# Patient Record
Sex: MALE | Race: WHITE | NOT HISPANIC OR LATINO | Employment: FULL TIME | URBAN - METROPOLITAN AREA
[De-identification: names, ages, dates, MRNs, and addresses within clinical notes are randomized per-mention and may not be internally consistent; named-entity substitution may affect disease eponyms.]

---
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
## 2020-06-03 ENCOUNTER — COMMUNICATION - HEALTHEAST (OUTPATIENT)
Dept: FAMILY MEDICINE | Facility: CLINIC | Age: 20
End: 2020-06-03

## 2020-06-04 ENCOUNTER — COMMUNICATION - HEALTHEAST (OUTPATIENT)
Dept: FAMILY MEDICINE | Facility: CLINIC | Age: 20
End: 2020-06-04

## 2020-08-31 ENCOUNTER — OFFICE VISIT - HEALTHEAST (OUTPATIENT)
Dept: INTERNAL MEDICINE | Facility: CLINIC | Age: 20
End: 2020-08-31

## 2020-08-31 ENCOUNTER — COMMUNICATION - HEALTHEAST (OUTPATIENT)
Dept: TELEHEALTH | Facility: CLINIC | Age: 20
End: 2020-08-31

## 2020-08-31 DIAGNOSIS — L70.0 ACNE VULGARIS: ICD-10-CM

## 2020-08-31 DIAGNOSIS — L70.9 ACNE: ICD-10-CM

## 2020-08-31 DIAGNOSIS — Z00.00 ROUTINE GENERAL MEDICAL EXAMINATION AT A HEALTH CARE FACILITY: ICD-10-CM

## 2020-08-31 RX ORDER — CLINDAMYCIN PHOSPHATE 11.9 MG/ML
SOLUTION TOPICAL EVERY MORNING
Qty: 30 ML | Refills: 4 | Status: SHIPPED | OUTPATIENT
Start: 2020-08-31

## 2020-08-31 RX ORDER — ADAPALENE 45 G/G
GEL TOPICAL AT BEDTIME
Qty: 45 G | Refills: 4 | Status: SHIPPED | OUTPATIENT
Start: 2020-08-31

## 2020-08-31 ASSESSMENT — MIFFLIN-ST. JEOR: SCORE: 2066.33

## 2021-01-12 ENCOUNTER — RECORDS - HEALTHEAST (OUTPATIENT)
Dept: ADMINISTRATIVE | Facility: OTHER | Age: 21
End: 2021-01-12

## 2021-01-18 ENCOUNTER — OFFICE VISIT - HEALTHEAST (OUTPATIENT)
Dept: INTERNAL MEDICINE | Facility: CLINIC | Age: 21
End: 2021-01-18

## 2021-01-18 DIAGNOSIS — G47.00 INSOMNIA, UNSPECIFIED TYPE: ICD-10-CM

## 2021-04-01 ENCOUNTER — RECORDS - HEALTHEAST (OUTPATIENT)
Dept: ADMINISTRATIVE | Facility: OTHER | Age: 21
End: 2021-04-01

## 2021-05-25 ENCOUNTER — RECORDS - HEALTHEAST (OUTPATIENT)
Dept: ADMINISTRATIVE | Facility: CLINIC | Age: 21
End: 2021-05-25

## 2021-05-31 VITALS — WEIGHT: 187.1 LBS | BODY MASS INDEX: 22.09 KG/M2 | HEIGHT: 77 IN

## 2021-06-01 VITALS — HEIGHT: 77 IN | WEIGHT: 199.4 LBS | BODY MASS INDEX: 23.54 KG/M2

## 2021-06-01 VITALS — BODY MASS INDEX: 23.56 KG/M2 | WEIGHT: 199.5 LBS | HEIGHT: 77 IN

## 2021-06-01 VITALS — BODY MASS INDEX: 23.7 KG/M2 | WEIGHT: 198.6 LBS

## 2021-06-01 VITALS — WEIGHT: 199 LBS | HEIGHT: 77 IN | BODY MASS INDEX: 23.5 KG/M2

## 2021-06-04 VITALS
BODY MASS INDEX: 24.44 KG/M2 | HEIGHT: 77 IN | HEART RATE: 87 BPM | WEIGHT: 207 LBS | DIASTOLIC BLOOD PRESSURE: 78 MMHG | SYSTOLIC BLOOD PRESSURE: 134 MMHG

## 2021-06-05 VITALS
DIASTOLIC BLOOD PRESSURE: 70 MMHG | WEIGHT: 211 LBS | BODY MASS INDEX: 25.02 KG/M2 | HEART RATE: 76 BPM | SYSTOLIC BLOOD PRESSURE: 105 MMHG

## 2021-06-08 NOTE — TELEPHONE ENCOUNTER
Who is calling:  Nichelle Echavarria mom is calling to reschedule the est care appointment the clinic called about because it needs to be in July for an in person visit.  It is scheduled as an establish care physical with Dr. Omer Martinez and it was okay at the time of the scheduling as it is exactly that in our system. However, it won't allow me to reschedule an establish care physical with Dr. Omer Martinez now. Probably because he is not accepting est. Care. However, needs to be rescheduled as an est. Care physical with Dr. Martinez as patient has been waiting to see him Can someone override this and help schedule for 8/31st afternoon  or 9/1 anytime. Please. Please call mom. Thank you!  Reason for Call:  See above   Date of last appointment with primary care: N/A  Okay to leave a detailed message: Yes

## 2021-06-08 NOTE — TELEPHONE ENCOUNTER
Please ask Pt to reschedule with PCP since its been over 2 years since he has been seen or see if he is wanting to EST care with a different provider. Appointment is not appropriate with the scheduled provider.

## 2021-06-11 NOTE — PROGRESS NOTES
Assessment:      Healthy male exam.      Acne, well controlled     Plan:      His topical acne medications were refilled.  We discussed responsible alcohol use should he choose to drink and protection with sexual intercourse.  Follow-up with me every couple of years for routine physical, earlier if needed.    Subjective:      Alvin Echavarria is a 20 y.o. male who presents for an annual exam.  He is feeling well today and has no acute complaints.  Past medical and surgical history reviewed.  Medications and allergies were reviewed and reconciled.  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    Immunization History   Administered Date(s) Administered     DTaP, historic 2000, 2000, 01/24/2001, 10/26/2001, 07/29/2005     Hep B, historic 2000, 2000, 10/26/2001     HiB, historic,unspecified 2000, 2000, 10/26/2001     IPV 2000, 2000, 01/24/2001, 07/29/2005     MMR 08/01/2001, 07/29/2005     Meningococcal MCV4P 10/19/2017     Pneumo Conj 7-V(before 2010) 2000, 2000, 01/24/2001, 08/01/2001     Tdap 08/10/2011     Varicella 08/01/2001, 08/10/2011     Immunization status: up to date and documented.    No exam data present    Current Outpatient Medications   Medication Sig Dispense Refill     adapalene (DIFFERIN) 0.1 % gel Apply topically at bedtime. Please call 24/7 for lab appointment to check lipids;order is in chart since July 45 g 4     clindamycin (CLEOCIN T) 1 % external solution Apply topically every morning. 30 mL 4     No current facility-administered medications for this visit.      History reviewed. No pertinent past medical history.  Past Surgical History:   Procedure Laterality Date     TYMPANOSTOMY TUBE PLACEMENT       Gentamicin  History reviewed. No pertinent family history.  Social History     Socioeconomic History     Marital status: Single     Spouse name: Not on file     Number of children: Not on file      "Years of education: Not on file     Highest education level: Not on file   Occupational History     Not on file   Social Needs     Financial resource strain: Not on file     Food insecurity     Worry: Not on file     Inability: Not on file     Transportation needs     Medical: Not on file     Non-medical: Not on file   Tobacco Use     Smoking status: Never Smoker     Smokeless tobacco: Never Used   Substance and Sexual Activity     Alcohol use: Not on file     Drug use: Not on file     Sexual activity: Not on file   Lifestyle     Physical activity     Days per week: Not on file     Minutes per session: Not on file     Stress: Not on file   Relationships     Social connections     Talks on phone: Not on file     Gets together: Not on file     Attends Advent service: Not on file     Active member of club or organization: Not on file     Attends meetings of clubs or organizations: Not on file     Relationship status: Not on file     Intimate partner violence     Fear of current or ex partner: Not on file     Emotionally abused: Not on file     Physically abused: Not on file     Forced sexual activity: Not on file   Other Topics Concern     Not on file   Social History Narrative     Not on file       Review of Systems  General:  Denies problem  Eyes: Denies problem  Ears/Nose/Throat: Denies problem  Cardiovascular: Denies problem  Respiratory:  Denies problem  Gastrointestinal:  Denies problem  Genitourinary: Denies problem  Musculoskeletal:  Denies problem  Skin: Denies problem  Neurologic: Denies problem  Psychiatric: Denies problem  Endocrine: Denies problem  Heme/Lymphatic: Denies problem   Allergic/Immunologic: Denies problem        Objective:     Vitals:    08/31/20 1445   BP: 134/78   Pulse: 87   Weight: 207 lb (93.9 kg)   Height: 6' 5\" (1.956 m)     Body mass index is 24.55 kg/m .    Physical  General Appearance: Alert, cooperative, no distress, appears stated age  Head: Normocephalic, without obvious " abnormality, atraumatic  Eyes: PERRL, conjunctiva/corneas clear, EOM's intact  Ears: Normal TM's and external ear canals, both ears  Nose: Nares normal, septum midline,mucosa normal, no drainage  Throat: Lips, mucosa, and tongue normal; teeth and gums normal  Neck: Supple, symmetrical, trachea midline, no adenopathy;  thyroid: not enlarged, symmetric, no tenderness/mass/nodules; no carotid bruit or JVD  Back: Symmetric, no curvature, ROM normal, no CVA tenderness  Lungs: Clear to auscultation bilaterally, respirations unlabored  Heart: Regular rate and rhythm, S1 and S2 normal, no murmur, rub, or gallop,  Abdomen: Soft, non-tender, bowel sounds active all four quadrants,  no masses, no organomegaly  Musculoskeletal: Normal range of motion. No joint swelling or deformity.   Extremities: Extremities normal, atraumatic, no cyanosis or edema  Skin: Skin color, texture, turgor normal, no rashes or lesions  Lymph nodes: Cervical, supraclavicular, and axillary nodes normal  Neurologic: He is alert. He has normal reflexes.   Psychiatric: He has a normal mood and affect.

## 2021-06-12 NOTE — PROGRESS NOTES
[facility] Well Child Check    ASSESSMENT & PLAN  Alvin Echavarria is a 17  y.o. 0  m.o. who has normal growth and normal development.    Diagnoses and all orders for this visit:    WCC (well child check) - Alvin has to work later, so family prefers to hold off on vaccines today; mom is not sure about Hepatitis A and HPV; counseling provided and VIS distributed; will order all vaccines for which he is due and hopefully when return will get all done; also will come back fasting for cholesterol    Meningococcal MCV4P; Future    HPV vaccine 9 valent 3 dose IM; Future    Hepatitis A vaccine pediatric/adolescent 2 dose IM; Future    Lipid profile; Future    Acne vulgaris - Has inflammatory and nodular lesions, more so on chest and upper arms; discussed option of oral antibiotic therapy instead of or in addition to topical, but mom worried about GI upset, so prefers to stay with topical; will call if adapalene 0.1% isn't covered and want to try 0.3%    Adapalene 0.1% gel; apply topically at bedtime; Dispense:  45 g; Refills:  4    Clindamycin 1% external solution; apply topically every morning; Dispense:  30 mL; Refills 4    Allergic conjunctivitis of both eyes    Olopatadine 0.2% ophthalmic drops; 1 drop in each eye 1-2 times daily as needed for allergies; Dispense 2.5 mL; Refills 2    Return to clinic in 1 year for a Well Child Check or sooner as needed    IMMUNIZATIONS/LABS  He will return for immunizations    REFERRALS  Dental:  The patient has already established care with a dentist.  Other:  No additional referrals were made at this time.    ANTICIPATORY GUIDANCE  I have reviewed age appropriate anticipatory guidance.    HEALTH HISTORY  Do you have any concerns that you'd like to discuss today?: No concerns Would like refills of topical acne medications, which he thinks are working well. Not too drying. Would also like refill of Pataday eye drops. Had flare of conjunctivitis last week, but settled down now. Has issues  in spring commonly.    Roomed by: Angy TAYLOR CMA    Accompanied by Mother    Refills needed? Yes    Do you have any forms that need to be filled out? No        Do you have any significant health concerns in your family history?: No  No family history on file.  Since your last visit, have there been any major changes in your family, such as a move, job change, separation, divorce, or death in the family?: No    Home  Who lives in your home?:  Mom, Dad & Brother  Social History     Social History Narrative     Do you have any trouble with sleep?:  No    Education  What school does your child attend?:  [employer]  What grade is your child in?:  12th  How does the patient perform in school (grades, behavior, attention, homework?: Good     Eating  Does patient eat regular meals including fruits and vegetables?:  yes  What is the patient drinking (cow's milk, water, soda, juice, sports drinks, energy drinks, etc)?: cow's milk- 2%, water and soda  Does patient have concerns about body or appearance?:  No    Activities  Does the patient have friends?:  yes  Does the patient get at least one hour of physical activity per day?:  no  Does the patient have less than 2 hours of screen time per day (aside from homework)?:  yes  What does your child do for exercise?:  Baseball & Basketball  Does the patient have interest/participate in community activities/volunteers/school sports?:  yes    MENTAL HEALTH SCREENING  PHQ-2 Score:  0    VISION/HEARING  Vision: Completed. See Results  Hearing:  Completed. See Results     Hearing Screening    Method: Audiometry    125Hz 250Hz 500Hz 1000Hz 2000Hz 3000Hz 4000Hz 6000Hz 8000Hz   Right ear:   25 20 20  20     Left ear:   25 20 20  20        Visual Acuity Screening    Right eye Left eye Both eyes   Without correction: 20/20 20/20    With correction:          TB Risk Assessment:  The patient and/or parent/guardian answer positive to:  none    Dental  Is your child being seen by a dentist?   "Yes  Flouride Varnish Application Screening    Patient Active Problem List   Diagnosis     Allergic conjunctivitis of both eyes     Acne     Dental erosion limited to enamel       Drugs  Does the patient use tobacco/alcohol/drugs?:  no    Safety  Does the patient have any safety concerns (peer or home)?:  no  Does the patient use safety belts, helmets and other safety equipment?:  yes    Sex  Is the patient sexually active?:  no    MEASUREMENTS  Height:  6' 4.5\" (1.943 m)  Weight: 187 lb 1.6 oz (84.9 kg)  BMI: Body mass index is 22.48 kg/(m^2).  Blood Pressure: 114/64  Blood pressure percentiles are 21 % systolic and 30 % diastolic based on NHBPEP's 4th Report. Blood pressure percentile targets: 90: 136/85, 95: 140/89, 99 + 5 mmH/102.    PHYSICAL EXAM  GEN: alert, well appearing  EYES: clear  R EAR: canal clear, TM pearly gray  L EAR: canal clear, TM pearly gray  NOSE: clear  OROPHARYNX: clear  NECK: supple, no significant LAD, no thyromegaly  CVS: RRR, normal S1/S2, no murmur  LUNGS: clear, no increased work of breathing  ABD: soft, non-tender, non-distended  : SMR 4-5; no hernias appreciated; testicles normal  EXT: warm, well perfused, no swelling  MSK: nl muscle bulk, spine straight  NEURO: CN grossly intact, nl strength in UE and LE, nl gait, no dysmetria  SKIN: inflammatory acne on chest and upper arms with occasional nodular lesions on face    "

## 2021-06-14 NOTE — PROGRESS NOTES
Assessment & Plan   Problem List Items Addressed This Visit     None      Visit Diagnoses     Insomnia, unspecified type    -  Primary         Insomnia, mild.  I do not hear any symptoms today that would be concerning for generalized anxiety disorder or panic disorder, which I am thankful for.  I told him that he could use melatonin 5 to 10 mg per night to try to train his brain and his body to sleep better while at school.  If he is not improved in 2 to 3 weeks he is going to contact me and we could consider other management at that time.  Otherwise he will follow-up as needed.          No follow-ups on file.    Omer Martinez MD  [facility]      Subjective     Alivn Echavarria is 20 y.o. and presents to clinic today for the following health issues     Rasheed comes in today for follow-up of ER visit for shortness of breath and a racing heartbeat.  He was seen on 1/12 after going in with the aformentioned symptoms.  BNP and troponin were negative.  D-dimer was negative.  EKG showed sinus tachycardia but was otherwise unremarkable.  Chest x-ray was negative.  He was then told to follow-up with me today.  Currently Alvin states that he has not had any recurrence of his symptoms.  He does think that they are related to some insomnia that he has had.  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  He states that when he is sleeping at college he has a hard time getting to sleep and that can affect his mood and alertness the next day.  He does not have any symptoms during the day per se.  He has not tried anything over-the-counter for sleep.        Objective    /70   Pulse 76   Wt 211 lb (95.7 kg)   BMI 25.02 kg/m    Body mass index is 25.02 kg/m .  Physical Exam

## 2021-06-16 PROBLEM — B07.0 PLANTAR WARTS: Status: ACTIVE | Noted: 2018-07-02

## 2021-06-17 NOTE — PROGRESS NOTES
"ASSESSMENT:  1. Plantar wart of right foot  Discussed warts, home treatment, cryotherapy, cantharidin, and other treatment options, and indications for dermatology consultation.  Treatment declined today.  Return as needed.      PLAN:  Patient Instructions   Daily Wart Instructions:  1.  Soak 10 minutes in warm water to soften the area.   2.  \"Sand\" with pumice stone or emery board.  3.  Liquid salicylic acid 17% (in the corn section at [employer]).      No orders of the defined types were placed in this encounter.    Medications Discontinued During This Encounter   Medication Reason     adapalene (DIFFERIN) 0.1 % gel Duplicate order     amoxicillin (AMOXIL) 400 mg/5 mL suspension Therapy completed       No Follow-up on file.    CHIEF COMPLAINT:  Chief Complaint   Patient presents with     Toe Pain     Right large toe possible wart, hurts just a little when pressed on       HISTORY OF PRESENT ILLNESS:  Alvin is a 17 y.o. male presenting to the clinic today with mom with concerns for right great toe pain, possible wart. Symptoms started last summer and bothers mom more than him. His wart is slightly painful when pressed but he denies pain with walking. Mom has been trying home treatment for the warts.    REVIEW OF SYSTEMS:   He has history of a wart on his face when he was much younger, likely treated by dermatology. All other systems are negative.    PFSH:  History of warts as reviewed above. He has prom this weekend and mom does not want him to be in pain for the day.     TOBACCO USE:  History   Smoking Status     Never Smoker   Smokeless Tobacco     Never Used       VITALS:  Vitals:    04/24/18 0928   BP: 102/56   Patient Site: Left Arm   Patient Position: Sitting   Cuff Size: Adult Large   Pulse: 68   Weight: 199 lb (90.3 kg)   Height: 6' 4.75\" (1.949 m)     Wt Readings from Last 3 Encounters:   04/24/18 199 lb (90.3 kg) (94 %, Z= 1.59)*   07/28/17 187 lb 1.6 oz (84.9 kg) (93 %, Z= 1.44)*   12/23/16 186 lb 11.2 oz " (84.7 kg) (94 %, Z= 1.57)*     * Growth percentiles are based on [facility] 2-20 Years data.     Body mass index is 23.75 kg/(m^2).    PHYSICAL EXAM:  Alert, no acute distress.   Skin, Approximately 1 cm diameter wart on the medial aspect of the pad of the right great toe.  Neuro, moving all extremities equally.    ADDITIONAL HISTORY SUMMARIZED (2): None.  DECISION TO OBTAIN EXTRA INFORMATION (1): None.   RADIOLOGY TESTS (1): None.  LABS (1): None.  MEDICINE TESTS (1): None.  INDEPENDENT REVIEW (2 each): None.     The visit lasted a total of 15 minutes face to face with the patient. Over 50% of the time was spent counseling and educating the patient about toe pain.    I, Sindi Bunn, am scribing for and in the presence of, Dr. Gu.    I, Daniel Gu, personally performed the services described in this documentation, as scribed by Sindi Bunn in my presence, and it is both accurate and complete.    MEDICATIONS:  Current Outpatient Prescriptions   Medication Sig Dispense Refill     adapalene (DIFFERIN) 0.1 % gel Apply topically at bedtime. Please call 24/7 for lab appointment to check lipids;order is in chart since July 45 g 4     clindamycin (CLEOCIN T) 1 % external solution Apply topically every morning. 30 mL 4     olopatadine (PATADAY) 0.2 % Drop Administer 1 drop to both eyes 2 (two) times a day as needed (for allergies). 2.5 mL 2     No current facility-administered medications for this visit.        Total data points: 0

## 2021-06-19 NOTE — PROGRESS NOTES
"ASSESSMENT:  1. Plantar warts      PLAN:  Cryotherapy was done ×3 with liquid nitrogen, with greater than 10 seconds thaw each time; there were no complications.  Home wart treatment using 17% salicylic acid was reviewed.  Return to clinic in 2 weeks for cryotherapy, if desired.    Patient Instructions   Daily Wart Instructions:  1.  Soak 10 minutes in warm water to soften the area.   2.  \"Sand\" with pumice stone or emery board.  3.  Liquid salicylic acid 17%.      No orders of the defined types were placed in this encounter.    There are no discontinued medications.    No Follow-up on file.    CHIEF COMPLAINT:  Chief Complaint   Patient presents with     Follow-up     Wart treatment- bilateral feet.        HISTORY OF PRESENT ILLNESS:  Alvin is a 18 y.o. male presenting to the clinic today with mom with concerns for plantar warts. He had this wart treated on 7/2 and has been doing at home treatments. The wart is present under the first digit of his right great toe. The wart is not painful. No new warts. He has missed a few days of treatment. He has been using a pumice stone to remove the callus.     REVIEW OF SYSTEMS:   All other systems are negative.    PFSH:  Reviewed as below.     TOBACCO USE:  History   Smoking Status     Never Smoker   Smokeless Tobacco     Never Used       VITALS:  Vitals:    07/20/18 0844   BP: 124/70   Patient Site: Left Arm   Patient Position: Sitting   Cuff Size: Adult Regular   Resp: 14   Weight: 198 lb 9.6 oz (90.1 kg)     Wt Readings from Last 3 Encounters:   07/20/18 198 lb 9.6 oz (90.1 kg) (94 %, Z= 1.55)*   07/02/18 199 lb 6.4 oz (90.4 kg) (94 %, Z= 1.58)*   04/24/18 199 lb (90.3 kg) (94 %, Z= 1.59)*     * Growth percentiles are based on CDC 2-20 Years data.     There is no height or weight on file to calculate BMI.    PHYSICAL EXAM:  Alert, well appearing male.   Skin, Large plantar wart on the medial volar aspect of the right great toe.   Neuro, moving all extremities " equally.    MEDICATIONS:  Current Outpatient Prescriptions   Medication Sig Dispense Refill     adapalene (DIFFERIN) 0.1 % gel Apply topically at bedtime. Please call 24/7 for lab appointment to check lipids;order is in chart since July 45 g 4     clindamycin (CLEOCIN T) 1 % external solution Apply topically every morning. 30 mL 4     olopatadine (PATADAY) 0.2 % Drop Administer 1 drop to both eyes 2 (two) times a day as needed (for allergies). 2.5 mL 2     No current facility-administered medications for this visit.          ADDITIONAL HISTORY SUMMARIZED (2): None.  DECISION TO OBTAIN EXTRA INFORMATION (1): None.   RADIOLOGY TESTS (1): None.  LABS (1): None.  MEDICINE TESTS (1): None.  INDEPENDENT REVIEW (2 each): None.     The visit lasted a total of 11 minutes face to face with the patient. Over 50% of the time was spent counseling and educating the patient about plantar wart.    I, Augustina Mancera, am scribing for and in the presence of, Dr. Gu.    I, Dr. Daniel Gu, personally performed the services described in this documentation, as scribed by Augustina Mancera in my presence, and it is both accurate and complete.    Total data points: 0

## 2021-06-19 NOTE — PROGRESS NOTES
"ASSESSMENT:  1. Plantar warts    PLAN:  Cryotherapy was done ×3 with liquid nitrogen, with greater than 10 seconds thaw each time; there were no complications. Home wart treatment using 17% salicylic acid was reviewed.  Return to clinic in 2 weeks for cryotherapy, if desired.    Patient Instructions   Tea tree oil for possible fungus of right great toenail.    Daily Wart Instructions:  1.  Soak 10 minutes in warm water to soften the area.   2.  \"Sand\" with pumice stone or emery board.  3.  Liquid salicylic acid 17%.      No orders of the defined types were placed in this encounter.    Medications Discontinued During This Encounter   Medication Reason     adapalene (DIFFERIN) 0.1 % gel      olopatadine 0.2 % Drop        No Follow-up on file.    CHIEF COMPLAINT:  Chief Complaint   Patient presents with     Warts     right toe        HISTORY OF PRESENT ILLNESS:  Alvin is a 17 y.o. male presenting to the clinic today with mom with concerns for wart on right great toe. Symptoms started early April and he was seen in clinic but did not want to treat to due upcoming prom. He did soak his toe and use a pumice stone without medication.    REVIEW OF SYSTEMS:   All other systems are negative.    PFSH:  He plans to travel to [place] with family this summer. He is also working two separate jobs this summer.    TOBACCO USE:  History   Smoking Status     Never Smoker   Smokeless Tobacco     Never Used       VITALS:  Vitals:    07/02/18 0923   BP: 94/62   Patient Site: Left Arm   Patient Position: Sitting   Cuff Size: Adult Large   Weight: 199 lb 6.4 oz (90.4 kg)   Height: 6' 4.75\" (1.949 m)     Wt Readings from Last 3 Encounters:   07/02/18 199 lb 6.4 oz (90.4 kg) (94 %, Z= 1.58)*   04/24/18 199 lb (90.3 kg) (94 %, Z= 1.59)*   07/28/17 187 lb 1.6 oz (84.9 kg) (93 %, Z= 1.44)*     * Growth percentiles are based on CDC 2-20 Years data.     Body mass index is 23.8 kg/(m^2).    PHYSICAL EXAM:  Alert, no acute distress.   Skin, " Approximately 1 cm diameter plantar wart on the medial volar aspect of the right great toe.    ADDITIONAL HISTORY SUMMARIZED (2): None.  DECISION TO OBTAIN EXTRA INFORMATION (1): None.   RADIOLOGY TESTS (1): None.  LABS (1): None.  MEDICINE TESTS (1): None.  INDEPENDENT REVIEW (2 each): None.     The visit lasted a total of 15 minutes face to face with the patient. Over 50% of the time was spent counseling and educating the patient about wart.    ISindi, am scribing for and in the presence of, Dr. Gu.    IDaniel, personally performed the services described in this documentation, as scribed by Sindi Bunn in my presence, and it is both accurate and complete.    MEDICATIONS:  Current Outpatient Prescriptions   Medication Sig Dispense Refill     adapalene (DIFFERIN) 0.1 % gel Apply topically at bedtime. Please call 24/7 for lab appointment to check lipids;order is in chart since July 45 g 4     clindamycin (CLEOCIN T) 1 % external solution Apply topically every morning. 30 mL 4     olopatadine (PATADAY) 0.2 % Drop Administer 1 drop to both eyes 2 (two) times a day as needed (for allergies). 2.5 mL 2     No current facility-administered medications for this visit.        Total data points: 0

## 2021-06-19 NOTE — PROGRESS NOTES
"Assessment     18 y.o. male  1. Plantar wart       Plan:     Cryotherapy was done ×3 with liquid nitrogen, with greater than 10 seconds thaw each time; there were no complications.  Home wart treatment using 17% salicylic acid was reviewed.  Return to clinic in 2 weeks for cryotherapy, if desired.      Subjective:      HPI: Alvin Echavarria is a 18 y.o. male here with mother for wart treatment.  There was a blister after his last cryotherapy that resolved quickly.  He has been using a pumice stone after soaking, and applying liquid salicylic acid almost nightly since his last visit.  He feels the wart is a little smaller.  No new lesions.  Overall health has been good.    Past Medical History:   Diagnosis Date     Constipation      History of airway aspiration      Pneumonia     LLL     Pyogenic granuloma      Speech/language delay      Warts     facial, flat warts     Past Surgical History:   Procedure Laterality Date     TYMPANOSTOMY TUBE PLACEMENT       Gentamicin  Outpatient Medications Prior to Visit   Medication Sig Dispense Refill     adapalene (DIFFERIN) 0.1 % gel Apply topically at bedtime. Please call 24/7 for lab appointment to check lipids;order is in chart since July 45 g 4     clindamycin (CLEOCIN T) 1 % external solution Apply topically every morning. 30 mL 4     olopatadine (PATADAY) 0.2 % Drop Administer 1 drop to both eyes 2 (two) times a day as needed (for allergies). 2.5 mL 2     No facility-administered medications prior to visit.      No family history on file.  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   Skin, on the medial aspect " of the pad of the right great toe there is a several centimeter diameter callus, with a central plantar wart, approximately 0.5 x 1 cm in diameter.

## 2021-06-25 NOTE — TELEPHONE ENCOUNTER
Refill Approved    Rx renewed per Medication Renewal Policy. Medication was last renewed on 7/28/17.    Ayse Pena, [facility] Triage/Med Refill 3/21/2019     Requested Prescriptions   Pending Prescriptions Disp Refills     olopatadine 0.2 % Drop [Pharmacy Med Name: OLOPATADINE HCL 0.2% EYE DROP] 2.5 mL 1     Sig: INSTILL 1 DROP IN EACH EYE 1-2 TIMES DAILY AS NEEDED FOR ALLERGIES.    Opthalmic Allergy Drops Refill Protocol Passed - 3/18/2019  8:25 PM       Passed - Patient has had office visit/physical in last 12 months    Last office visit with prescriber/PCP: 8/10/2018 Daniel Gu MD OR same dept: 8/10/2018 Daniel Gu MD OR same specialty: 8/10/2018 Daniel Gu MD  Last physical: 7/15/2016 Last MTM visit: Visit date not found   Next visit within 3 mo: Visit date not found  Next physical within 3 mo: Visit date not found  Prescriber OR PCP: Daniel Gu MD  Last diagnosis associated with med order: 1. Allergic conjunctivitis of both eyes  - olopatadine 0.2 % Drop [Pharmacy Med Name: OLOPATADINE HCL 0.2% EYE DROP]; INSTILL 1 DROP IN EACH EYE 1-2 TIMES DAILY AS NEEDED FOR ALLERGIES.  Dispense: 2.5 mL; Refill: 1    If protocol passes may refill for 12 months if within 3 months of last provider visit (or a total of 15 months).

## 2021-09-08 ENCOUNTER — TELEPHONE (OUTPATIENT)
Dept: INTERNAL MEDICINE | Facility: CLINIC | Age: 21
End: 2021-09-08

## 2021-09-08 DIAGNOSIS — L70.0 ACNE VULGARIS: Primary | ICD-10-CM

## 2021-09-08 NOTE — TELEPHONE ENCOUNTER
Pt is requesting a new rx for Tretinoin 0.05% cream which is not on his med list. Please advise. Thanks.

## 2021-09-12 RX ORDER — TRETINOIN 0.5 MG/G
CREAM TOPICAL
Qty: 45 G | Refills: 6 | OUTPATIENT
Start: 2021-09-12

## 2021-09-12 NOTE — TELEPHONE ENCOUNTER
Disp Refills Start End CORDELL    tretinoin (RETIN-A) 0.05 % cream (Discontinued)   1/24/2020 8/31/2020 --   Sig: APPLY A PEA SIZE AMOUNT AT BEDTIME EVERY 3RD NIGHT, INCREASE SLOWLY TO EVERY NIGHT AS TOLERATED   Class: Historical Med   tretinoin (RETIN-A) 0.05 % cream [[id number]]  Patient-reported historical medication  Ordering date: 08/31/20 1448 Authorized by: PROVIDER, HISTORICAL   Frequency:  01/24/20 - 08/31/20 Discontinued by: Omer Martinez MD 08/31/20 8191

## 2021-09-27 NOTE — TELEPHONE ENCOUNTER
Mom called back, states that patient discussed this with Dr Martinez at Jan 2021 visit and Dr Martinez agreed that he would take over refilling the prescription from Dermatology.  Please advise if provider unwilling to prescribe.    Requesting for this to be refilled at this time.

## 2021-09-28 RX ORDER — TRETINOIN 0.5 MG/G
CREAM TOPICAL AT BEDTIME
Qty: 45 G | Refills: 3 | Status: SHIPPED | OUTPATIENT
Start: 2021-09-28 | End: 2023-01-04

## 2023-01-03 DIAGNOSIS — L70.0 ACNE VULGARIS: ICD-10-CM

## 2023-01-04 RX ORDER — TRETINOIN 0.5 MG/G
CREAM TOPICAL AT BEDTIME
Qty: 45 G | Refills: 0 | Status: SHIPPED | OUTPATIENT
Start: 2023-01-04 | End: 2023-05-28

## 2023-01-04 NOTE — TELEPHONE ENCOUNTER
"Routing refill request to provider for review/approval because:  A break in medication  Patient needs to be seen because it has been more than 1 year since last office visit.    Last Written Prescription Date:  9/28/21  Last Fill Quantity: 45 g,  # refills: 3   Last office visit provider:  1/18/21     Requested Prescriptions   Pending Prescriptions Disp Refills     tretinoin (RETIN-A) 0.05 % external cream [Pharmacy Med Name: TRETINOIN 0.05% CREAM] 45 g 3     Sig: APPLY TOPICALLY AT BEDTIME       Topical Acne Medications Protocol Failed - 1/4/2023  9:16 AM        Failed - Recent (12 mo) or future (30 days) visit within the authorizing provider's specialty     Patient has had an office visit with the authorizing provider or a provider within the authorizing providers department within the previous 12 mos or has a future within next 30 days. See \"Patient Info\" tab in inbasket, or \"Choose Columns\" in Meds & Orders section of the refill encounter.              Passed - Patient is 12 years of age or older        Passed - Medication is active on med list             Will Gomez RN 01/04/23 9:16 AM  "

## 2023-04-06 ENCOUNTER — APPOINTMENT (OUTPATIENT)
Dept: URBAN - METROPOLITAN AREA CLINIC 256 | Age: 23
Setting detail: DERMATOLOGY
End: 2023-04-07

## 2023-04-06 VITALS — WEIGHT: 220 LBS | HEIGHT: 77 IN

## 2023-04-06 DIAGNOSIS — L70.0 ACNE VULGARIS: ICD-10-CM

## 2023-04-06 PROCEDURE — OTHER COUNSELING: OTHER

## 2023-04-06 PROCEDURE — 99203 OFFICE O/P NEW LOW 30 MIN: CPT

## 2023-04-06 PROCEDURE — OTHER DEFER: OTHER

## 2023-04-06 PROCEDURE — OTHER PRESCRIPTION MEDICATION MANAGEMENT: OTHER

## 2023-04-06 PROCEDURE — OTHER ADDITIONAL NOTES: OTHER

## 2023-04-06 ASSESSMENT — LOCATION SIMPLE DESCRIPTION DERM: LOCATION SIMPLE: LEFT CHEEK

## 2023-04-06 ASSESSMENT — LOCATION DETAILED DESCRIPTION DERM: LOCATION DETAILED: LEFT INFERIOR CENTRAL MALAR CHEEK

## 2023-04-06 ASSESSMENT — LOCATION ZONE DERM: LOCATION ZONE: FACE

## 2023-04-06 NOTE — HPI: PIMPLES (ACNE)
What Type Of Note Output Would You Prefer (Optional)?: Standard Output
How Severe Is Your Acne?: severe
Is This A New Presentation, Or A Follow-Up?: Acne
Additional Comments (Use Complete Sentences): Patient’s main concern today is scarring from past acne. In the past, patient tried only topical treatment - Tretinoin. (He is unable to swallow pills).

## 2023-04-06 NOTE — PROCEDURE: DEFER
Size Of Lesion In Cm (Optional): 0
Detail Level: Detailed
Introduction Text (Please End With A Colon): The following procedure was deferred:
Other Procedure: Fraxel or microneedling

## 2023-04-06 NOTE — PROCEDURE: ADDITIONAL NOTES
Additional Notes: Patient recommended to have consult with spa side regarding micro-needling or Fraxel. Patient educated about both procedures.
Detail Level: Simple
Render Risk Assessment In Note?: no

## 2023-04-06 NOTE — PROCEDURE: COUNSELING
Use Enhanced Medication Counseling?: No
Spironolactone Counseling: Patient advised regarding risks of diarrhea, abdominal pain, hyperkalemia, birth defects (for female patients), liver toxicity and renal toxicity. The patient may need blood work to monitor liver and kidney function and potassium levels while on therapy. The patient verbalized understanding of the proper use and possible adverse effects of spironolactone.  All of the patient's questions and concerns were addressed.
Minocycline Counseling: Patient advised regarding possible photosensitivity and discoloration of the teeth, skin, lips, tongue and gums.  Patient instructed to avoid sunlight, if possible.  When exposed to sunlight, patients should wear protective clothing, sunglasses, and sunscreen.  The patient was instructed to call the office immediately if the following severe adverse effects occur:  hearing changes, easy bruising/bleeding, severe headache, or vision changes.  The patient verbalized understanding of the proper use and possible adverse effects of minocycline.  All of the patient's questions and concerns were addressed.
Winlevi Pregnancy And Lactation Text: This medication is considered safe during pregnancy and breastfeeding.
Topical Sulfur Applications Pregnancy And Lactation Text: This medication is Pregnancy Category C and has an unknown safety profile during pregnancy. It is unknown if this topical medication is excreted in breast milk.
Aklief counseling:  Patient advised to apply a pea-sized amount only at bedtime and wait 30 minutes after washing their face before applying.  If too drying, patient may add a non-comedogenic moisturizer.  The most commonly reported side effects including irritation, redness, scaling, dryness, stinging, burning, itching, and increased risk of sunburn.  The patient verbalized understanding of the proper use and possible adverse effects of retinoids.  All of the patient's questions and concerns were addressed.
Aklief Pregnancy And Lactation Text: It is unknown if this medication is safe to use during pregnancy.  It is unknown if this medication is excreted in breast milk.  Breastfeeding women should use the topical cream on the smallest area of the skin for the shortest time needed while breastfeeding.  Do not apply to nipple and areola.
Azelaic Acid Pregnancy And Lactation Text: This medication is considered safe during pregnancy and breast feeding.
Dapsone Counseling: I discussed with the patient the risks of dapsone including but not limited to hemolytic anemia, agranulocytosis, rashes, methemoglobinemia, kidney failure, peripheral neuropathy, headaches, GI upset, and liver toxicity.  Patients who start dapsone require monitoring including baseline LFTs and weekly CBCs for the first month, then every month thereafter.  The patient verbalized understanding of the proper use and possible adverse effects of dapsone.  All of the patient's questions and concerns were addressed.
Azithromycin Counseling:  I discussed with the patient the risks of azithromycin including but not limited to GI upset, allergic reaction, drug rash, diarrhea, and yeast infections.
Birth Control Pills Pregnancy And Lactation Text: This medication should be avoided if pregnant and for the first 30 days post-partum.
Spironolactone Pregnancy And Lactation Text: This medication can cause feminization of the male fetus and should be avoided during pregnancy. The active metabolite is also found in breast milk.
Detail Level: Zone
Erythromycin Counseling:  I discussed with the patient the risks of erythromycin including but not limited to GI upset, allergic reaction, drug rash, diarrhea, increase in liver enzymes, and yeast infections.
Benzoyl Peroxide Pregnancy And Lactation Text: This medication is Pregnancy Category C. It is unknown if benzoyl peroxide is excreted in breast milk.
Topical Sulfur Applications Counseling: Topical Sulfur Counseling: Patient counseled that this medication may cause skin irritation or allergic reactions.  In the event of skin irritation, the patient was advised to reduce the amount of the drug applied or use it less frequently.   The patient verbalized understanding of the proper use and possible adverse effects of topical sulfur application.  All of the patient's questions and concerns were addressed.
High Dose Vitamin A Pregnancy And Lactation Text: High dose vitamin A therapy is contraindicated during pregnancy and breast feeding.
Tetracycline Pregnancy And Lactation Text: This medication is Pregnancy Category D and not consider safe during pregnancy. It is also excreted in breast milk.
Sarecycline Counseling: Patient advised regarding possible photosensitivity and discoloration of the teeth, skin, lips, tongue and gums.  Patient instructed to avoid sunlight, if possible.  When exposed to sunlight, patients should wear protective clothing, sunglasses, and sunscreen.  The patient was instructed to call the office immediately if the following severe adverse effects occur:  hearing changes, easy bruising/bleeding, severe headache, or vision changes.  The patient verbalized understanding of the proper use and possible adverse effects of sarecycline.  All of the patient's questions and concerns were addressed.
Topical Clindamycin Pregnancy And Lactation Text: This medication is Pregnancy Category B and is considered safe during pregnancy. It is unknown if it is excreted in breast milk.
Benzoyl Peroxide Counseling: Patient counseled that medicine may cause skin irritation and bleach clothing.  In the event of skin irritation, the patient was advised to reduce the amount of the drug applied or use it less frequently.   The patient verbalized understanding of the proper use and possible adverse effects of benzoyl peroxide.  All of the patient's questions and concerns were addressed.
Bactrim Counseling:  I discussed with the patient the risks of sulfa antibiotics including but not limited to GI upset, allergic reaction, drug rash, diarrhea, dizziness, photosensitivity, and yeast infections.  Rarely, more serious reactions can occur including but not limited to aplastic anemia, agranulocytosis, methemoglobinemia, blood dyscrasias, liver or kidney failure, lung infiltrates or desquamative/blistering drug rashes.
Topical Retinoid Pregnancy And Lactation Text: This medication is Pregnancy Category C. It is unknown if this medication is excreted in breast milk.
Topical Clindamycin Counseling: Patient counseled that this medication may cause skin irritation or allergic reactions.  In the event of skin irritation, the patient was advised to reduce the amount of the drug applied or use it less frequently.   The patient verbalized understanding of the proper use and possible adverse effects of clindamycin.  All of the patient's questions and concerns were addressed.
Dapsone Pregnancy And Lactation Text: This medication is Pregnancy Category C and is not considered safe during pregnancy or breast feeding.
Topical Retinoid counseling:  Patient advised to apply a pea-sized amount only at bedtime and wait 30 minutes after washing their face before applying.  If too drying, patient may add a non-comedogenic moisturizer. The patient verbalized understanding of the proper use and possible adverse effects of retinoids.  All of the patient's questions and concerns were addressed.
Doxycycline Pregnancy And Lactation Text: This medication is Pregnancy Category D and not consider safe during pregnancy. It is also excreted in breast milk but is considered safe for shorter treatment courses.
Isotretinoin Pregnancy And Lactation Text: This medication is Pregnancy Category X and is considered extremely dangerous during pregnancy. It is unknown if it is excreted in breast milk.
Erythromycin Pregnancy And Lactation Text: This medication is Pregnancy Category B and is considered safe during pregnancy. It is also excreted in breast milk.
Azithromycin Pregnancy And Lactation Text: This medication is considered safe during pregnancy and is also secreted in breast milk.
Isotretinoin Counseling: Patient should get monthly blood tests, not donate blood, not drive at night if vision affected, not share medication, and not undergo elective surgery for 6 months after tx completed. Side effects reviewed, pt to contact office should one occur.
Doxycycline Counseling:  Patient counseled regarding possible photosensitivity and increased risk for sunburn.  Patient instructed to avoid sunlight, if possible.  When exposed to sunlight, patients should wear protective clothing, sunglasses, and sunscreen.  The patient was instructed to call the office immediately if the following severe adverse effects occur:  hearing changes, easy bruising/bleeding, severe headache, or vision changes.  The patient verbalized understanding of the proper use and possible adverse effects of doxycycline.  All of the patient's questions and concerns were addressed.
Tetracycline Counseling: Patient counseled regarding possible photosensitivity and increased risk for sunburn.  Patient instructed to avoid sunlight, if possible.  When exposed to sunlight, patients should wear protective clothing, sunglasses, and sunscreen.  The patient was instructed to call the office immediately if the following severe adverse effects occur:  hearing changes, easy bruising/bleeding, severe headache, or vision changes.  The patient verbalized understanding of the proper use and possible adverse effects of tetracycline.  All of the patient's questions and concerns were addressed. Patient understands to avoid pregnancy while on therapy due to potential birth defects.
Tazorac Counseling:  Patient advised that medication is irritating and drying.  Patient may need to apply sparingly and wash off after an hour before eventually leaving it on overnight.  The patient verbalized understanding of the proper use and possible adverse effects of tazorac.  All of the patient's questions and concerns were addressed.
Bactrim Pregnancy And Lactation Text: This medication is Pregnancy Category D and is known to cause fetal risk.  It is also excreted in breast milk.
High Dose Vitamin A Counseling: Side effects reviewed, pt to contact office should one occur.
Winlevi Counseling:  I discussed with the patient the risks of topical clascoterone including but not limited to erythema, scaling, itching, and stinging. Patient voiced their understanding.
Azelaic Acid Counseling: Patient counseled that medicine may cause skin irritation and to avoid applying near the eyes.  In the event of skin irritation, the patient was advised to reduce the amount of the drug applied or use it less frequently.   The patient verbalized understanding of the proper use and possible adverse effects of azelaic acid.  All of the patient's questions and concerns were addressed.
Tazorac Pregnancy And Lactation Text: This medication is not safe during pregnancy. It is unknown if this medication is excreted in breast milk.
Birth Control Pills Counseling: Birth Control Pill Counseling: I discussed with the patient the potential side effects of OCPs including but not limited to increased risk of stroke, heart attack, thrombophlebitis, deep venous thrombosis, hepatic adenomas, breast changes, GI upset, headaches, and depression.  The patient verbalized understanding of the proper use and possible adverse effects of OCPs. All of the patient's questions and concerns were addressed.

## 2023-04-28 ENCOUNTER — APPOINTMENT (OUTPATIENT)
Dept: URBAN - METROPOLITAN AREA CLINIC 256 | Age: 23
Setting detail: DERMATOLOGY
End: 2023-04-28

## 2023-04-28 DIAGNOSIS — Z41.9 ENCOUNTER FOR PROCEDURE FOR PURPOSES OTHER THAN REMEDYING HEALTH STATE, UNSPECIFIED: ICD-10-CM

## 2023-04-28 PROCEDURE — OTHER SKINPEN: OTHER

## 2023-04-28 NOTE — PROCEDURE: SKINPEN
Price (Use Numbers Only, No Special Characters Or $): 469 Price (Use Numbers Only, No Special Characters Or $): 999

## 2023-05-26 DIAGNOSIS — L70.0 ACNE VULGARIS: ICD-10-CM

## 2023-05-26 NOTE — TELEPHONE ENCOUNTER
"Routing refill request to provider for review/approval because:  Patient needs to be seen because it has been more than 1 year since last office visit.    Last Written Prescription Date:  1/4/23  Last Fill Quantity: 45,  # refills: 0   Last office visit provider:  1/18/21     Requested Prescriptions   Pending Prescriptions Disp Refills     tretinoin (RETIN-A) 0.05 % external cream [Pharmacy Med Name: TRETINOIN 0.05% CREAM] 45 g 0     Sig: APPLY TO AFFECTED AREA EVERY DAY AT BEDTIME       Topical Acne Medications Protocol Failed - 5/26/2023  7:33 AM        Failed - Recent (12 mo) or future (30 days) visit within the authorizing provider's specialty     Patient has had an office visit with the authorizing provider or a provider within the authorizing providers department within the previous 12 mos or has a future within next 30 days. See \"Patient Info\" tab in inbasket, or \"Choose Columns\" in Meds & Orders section of the refill encounter.              Passed - Patient is 12 years of age or older        Passed - Medication is active on med list             Ayse Pena RN 05/26/23 5:43 PM  "

## 2023-05-28 RX ORDER — TRETINOIN 0.5 MG/G
CREAM TOPICAL
Qty: 45 G | Refills: 0 | Status: SHIPPED | OUTPATIENT
Start: 2023-05-28 | End: 2023-07-20

## 2023-06-09 ENCOUNTER — APPOINTMENT (OUTPATIENT)
Dept: URBAN - METROPOLITAN AREA CLINIC 281 | Age: 23
Setting detail: DERMATOLOGY
End: 2023-06-09

## 2023-06-09 DIAGNOSIS — Z41.9 ENCOUNTER FOR PROCEDURE FOR PURPOSES OTHER THAN REMEDYING HEALTH STATE, UNSPECIFIED: ICD-10-CM

## 2023-06-09 PROCEDURE — OTHER SKINPEN: OTHER

## 2023-06-09 NOTE — PROCEDURE: SKINPEN
Detail Level: Zone
Treatment Number (Optional): 2
Location #1: cheeks
Depth In Mm: 1.75
Location #2: nose
Depth In Mm: 0.25
Location #3: chin
Depth In Mm: 1
Post-Care Instructions: After the procedure, take precautions agains sun exposure. Do not apply sunscreen for 12 hours after the procedure. Do not apply make-up for 12 hours after the procedure. Avoid alcohol based toners for 10-14 days. After 2-3 days patients can return to their regular skin regimen.
Consent: Written consent obtained, risks reviewed including but not limited to pain, scarring, infection and incomplete improvement.  Patient understands the procedure is cosmetic in nature and will require out of pocket payment.

## 2023-09-15 ENCOUNTER — APPOINTMENT (OUTPATIENT)
Dept: URBAN - METROPOLITAN AREA CLINIC 256 | Age: 23
Setting detail: DERMATOLOGY
End: 2023-09-15

## 2023-09-15 DIAGNOSIS — Z41.9 ENCOUNTER FOR PROCEDURE FOR PURPOSES OTHER THAN REMEDYING HEALTH STATE, UNSPECIFIED: ICD-10-CM

## 2023-09-15 PROCEDURE — OTHER SKINPEN: OTHER

## 2023-09-15 ASSESSMENT — LOCATION DETAILED DESCRIPTION DERM: LOCATION DETAILED: LEFT INFERIOR MEDIAL FOREHEAD

## 2023-09-15 ASSESSMENT — LOCATION SIMPLE DESCRIPTION DERM: LOCATION SIMPLE: LEFT FOREHEAD

## 2023-09-15 ASSESSMENT — LOCATION ZONE DERM: LOCATION ZONE: FACE

## 2023-09-15 NOTE — PROCEDURE: SKINPEN
Depth In Mm: 0.75
Consent: Written consent obtained, risks reviewed including but not limited to pain, scarring, infection and incomplete improvement.  Patient understands the procedure is cosmetic in nature and will require out of pocket payment.
Location #4: nose
Depth In Mm: 0.25
Location #1: forehead
Detail Level: Zone
Treatment Number (Optional): 0
Depth In Mm: 1
Depth In Mm: 2
Location #3: chin
Post-Care Instructions: After the procedure, take precautions agains sun exposure. Do not apply sunscreen for 12 hours after the procedure. Do not apply make-up for 12 hours after the procedure. Avoid alcohol based toners for 10-14 days. After 2-3 days patients can return to their regular skin regimen.
Location #2: cheeks

## 2023-09-19 ENCOUNTER — TELEPHONE (OUTPATIENT)
Dept: FAMILY MEDICINE | Facility: CLINIC | Age: 23
End: 2023-09-19
Payer: COMMERCIAL

## 2023-09-21 NOTE — TELEPHONE ENCOUNTER
Attempted to call patient regarding scheduling a sooner appointment.   Left message and callback # for [facility]  Upon patient call back, OKAY to use SAME DAY openings!      [facility], [facility], September 21, 2023, 8:24 AM

## 2023-10-06 ENCOUNTER — OFFICE VISIT (OUTPATIENT)
Dept: FAMILY MEDICINE | Facility: CLINIC | Age: 23
End: 2023-10-06
Payer: COMMERCIAL

## 2023-10-06 VITALS
TEMPERATURE: 98.4 F | WEIGHT: 224 LBS | HEIGHT: 77 IN | SYSTOLIC BLOOD PRESSURE: 134 MMHG | HEART RATE: 98 BPM | BODY MASS INDEX: 26.45 KG/M2 | RESPIRATION RATE: 17 BRPM | OXYGEN SATURATION: 100 % | DIASTOLIC BLOOD PRESSURE: 96 MMHG

## 2023-10-06 DIAGNOSIS — J06.9 VIRAL UPPER RESPIRATORY TRACT INFECTION: ICD-10-CM

## 2023-10-06 DIAGNOSIS — Z23 IMMUNIZATION DUE: ICD-10-CM

## 2023-10-06 DIAGNOSIS — Z00.00 ANNUAL PHYSICAL EXAM: Primary | ICD-10-CM

## 2023-10-06 LAB — SARS-COV-2 RNA RESP QL NAA+PROBE: NEGATIVE

## 2023-10-06 PROCEDURE — 99213 OFFICE O/P EST LOW 20 MIN: CPT | Mod: 25 | Performed by: FAMILY MEDICINE

## 2023-10-06 PROCEDURE — 87635 SARS-COV-2 COVID-19 AMP PRB: CPT | Performed by: FAMILY MEDICINE

## 2023-10-06 PROCEDURE — 99395 PREV VISIT EST AGE 18-39: CPT | Performed by: FAMILY MEDICINE

## 2023-10-06 ASSESSMENT — ENCOUNTER SYMPTOMS
COUGH: 1
EYE PAIN: 0
NERVOUS/ANXIOUS: 1
SHORTNESS OF BREATH: 0
NAUSEA: 0
FEVER: 0
CHILLS: 0
WEAKNESS: 0
DIARRHEA: 0
HEADACHES: 1
HEMATOCHEZIA: 0
JOINT SWELLING: 0
HEMATURIA: 0
CONSTIPATION: 0
ARTHRALGIAS: 0
SORE THROAT: 0
FREQUENCY: 0
HEARTBURN: 0
PARESTHESIAS: 0
PALPITATIONS: 0
DIZZINESS: 0
MYALGIAS: 0
ABDOMINAL PAIN: 0
DYSURIA: 0

## 2023-12-29 ENCOUNTER — TRANSFERRED RECORDS (OUTPATIENT)
Dept: HEALTH INFORMATION MANAGEMENT | Facility: CLINIC | Age: 23
End: 2023-12-29
Payer: COMMERCIAL

## 2024-02-16 ENCOUNTER — APPOINTMENT (OUTPATIENT)
Dept: URBAN - METROPOLITAN AREA CLINIC 256 | Age: 24
Setting detail: DERMATOLOGY
End: 2024-02-16

## 2024-02-16 DIAGNOSIS — Z41.9 ENCOUNTER FOR PROCEDURE FOR PURPOSES OTHER THAN REMEDYING HEALTH STATE, UNSPECIFIED: ICD-10-CM

## 2024-02-16 PROCEDURE — OTHER SKINPEN: OTHER

## 2024-02-16 ASSESSMENT — LOCATION DETAILED DESCRIPTION DERM
LOCATION DETAILED: LEFT INFERIOR CENTRAL MALAR CHEEK
LOCATION DETAILED: RIGHT INFERIOR CENTRAL MALAR CHEEK
LOCATION DETAILED: LEFT CHIN
LOCATION DETAILED: INFERIOR MID FOREHEAD

## 2024-02-16 ASSESSMENT — LOCATION ZONE DERM: LOCATION ZONE: FACE

## 2024-02-16 ASSESSMENT — LOCATION SIMPLE DESCRIPTION DERM
LOCATION SIMPLE: INFERIOR FOREHEAD
LOCATION SIMPLE: RIGHT CHEEK
LOCATION SIMPLE: LEFT CHEEK
LOCATION SIMPLE: CHIN

## 2024-02-16 NOTE — PROCEDURE: SKINPEN
Post-Care Instructions: After the procedure, take precautions agains sun exposure. Do not apply sunscreen for 12 hours after the procedure. Do not apply make-up for 12 hours after the procedure. Avoid alcohol based toners for 10-14 days. After 2-3 days patients can return to their regular skin regimen.
Depth In Mm: 0.25
Location #2: cheeks
Depth In Mm: 2
Depth In Mm: 1
Treatment Number (Optional): 0
Location #1: forehead
Depth In Mm: 0.5
Consent: Written consent obtained, risks reviewed including but not limited to pain, scarring, infection and incomplete improvement.  Patient understands the procedure is cosmetic in nature and will require out of pocket payment.
Location #3: chin
Detail Level: Zone
Depth In Mm: 1.25

## 2024-02-19 DIAGNOSIS — L70.0 ACNE VULGARIS: ICD-10-CM

## 2024-02-19 RX ORDER — TRETINOIN 0.5 MG/G
CREAM TOPICAL
Qty: 45 G | Refills: 1 | Status: SHIPPED | OUTPATIENT
Start: 2024-02-19

## 2024-05-06 NOTE — PROGRESS NOTES
Postoperative Wound Care Instructions  Physician: Dr. Philipp Akbar    after surgery and until Wednesday  Do not remove the pressure bandage that has been applied to your surgical site in order to prevent bleeding.  Keep the surgical site and bandage dry.  Apply ice to the area, 15 to 20 minutes every hour until bedtime for 24-48 hours.  Applying ice to the area will help reduce both pain and swelling.  A “baggy” filled with crushed ice and wrapped in a this towel will do nicely.    Bleeding  If bleeding occurs following surgery, apply constant pressure on the bandage for 30 minutes without peeking.  That will stop minor bleeding.  If bleeding does not stop, notify our office immediately.  Phone number: [phone].       Activity  To reduce the possibility of bleeding, please follow these instructions:  Limit all activities for the first 24 hours and limit strenuous activities for the first 2 weeks following surgery.  Limiting activities will help in the healing of your scar and cosmetic outcome.  Keep the operative site elevated   If your surgery was on the face, head, or neck: avoid bending or heavy lifting and straining, and sleep with your head and shoulders elevated on extra pillows.      Pain Medications - as needed for pain  Following you surgery, start acetaminophen 1000mg every 8 hours (2 extra strength Tylenol).                           SUBJECTIVE:   CC: Alvin is an 23 year old who presents for preventative health visit.       10/6/2023     3:24 PM   Additional Questions   Roomed by Sujey ALVA       Healthy Habits:     Getting at least 3 servings of Calcium per day:  NO    Bi-annual eye exam:  Yes    Dental care twice a year:  Yes    Sleep apnea or symptoms of sleep apnea:  None    Diet:  Regular (no restrictions)    Frequency of exercise:  4-5 days/week    Duration of exercise:  Greater than 60 minutes    Taking medications regularly:  Yes    Medication side effects:  Not applicable    Additional concerns today:  No  Cough  Associated symptoms include headaches. Pertinent negatives include no chest pain, no chills, no ear pain, no sore throat, no myalgias and no shortness of breath.     Cough for the last 2 days with nasal congestion, few headaches. He has been working out and playing softball. He has been immunized from COVID-19.    Today's PHQ-2 Score:       10/6/2023     3:10 PM   PHQ-2 ( 1999 [employer])   Q1: Little interest or pleasure in doing things 0   Q2: Feeling down, depressed or hopeless 0   PHQ-2 Score 0   Q1: Little interest or pleasure in doing things Not at all   Q2: Feeling down, depressed or hopeless Not at all   PHQ-2 Score 0         Have you ever done Advance Care Planning? (For example, a Health Directive, POLST, or a discussion with a medical provider or your loved ones about your wishes): No, advance care planning information given to patient to review.  Patient plans to discuss their wishes with loved ones or provider.      Social History     Tobacco Use    Smoking status: Never    Smokeless tobacco: Never   Substance Use Topics    Alcohol use: Not on file             10/6/2023     3:10 PM   Alcohol Use   Prescreen: >3 drinks/day or >7 drinks/week? No       Last PSA: No results found for: PSA    Reviewed orders with patient. Reviewed health maintenance and updated orders accordingly - Yes      Reviewed and updated as  "needed this visit by clinical staff   Tobacco  Allergies  Meds              Reviewed and updated as needed this visit by Provider                     Review of Systems   Constitutional:  Negative for chills and fever.   HENT:  Positive for congestion. Negative for ear pain, hearing loss and sore throat.    Eyes:  Negative for pain and visual disturbance.   Respiratory:  Positive for cough. Negative for shortness of breath.    Cardiovascular:  Negative for chest pain, palpitations and peripheral edema.   Gastrointestinal:  Negative for abdominal pain, constipation, diarrhea, heartburn, hematochezia and nausea.   Genitourinary:  Negative for dysuria, frequency, genital sores, hematuria, impotence, penile discharge and urgency.   Musculoskeletal:  Negative for arthralgias, joint swelling and myalgias.   Skin:  Negative for rash.   Neurological:  Positive for headaches. Negative for dizziness, weakness and paresthesias.   Psychiatric/Behavioral:  Negative for mood changes. The patient is nervous/anxious.          OBJECTIVE:   BP (!) 134/96   Pulse 98   Temp 98.4  F (36.9  C)   Resp 17   Ht 1.943 m (6' 4.5\")   Wt 101.6 kg (224 lb)   SpO2 100%   BMI 26.91 kg/m      Physical Exam  GENERAL: healthy, alert and no distress  EYES: Eyes grossly normal to inspection, PERRL and conjunctivae and sclerae normal  HENT: ear canals and TM's normal, nose and mouth without ulcers or lesions  NECK: no adenopathy, no asymmetry, masses, or scars and thyroid normal to palpation  RESP: lungs clear to auscultation - no rales, rhonchi or wheezes  CV: regular rate and rhythm, normal S1 S2, no S3 or S4, no murmur, click or rub, no peripheral edema and peripheral pulses strong  ABDOMEN: soft, nontender, no hepatosplenomegaly, no masses and bowel sounds normal  MS: no gross musculoskeletal defects noted, no edema  SKIN: no suspicious lesions or rashes  NEURO: Normal strength and tone, mentation intact and speech normal  PSYCH: mentation " "appears normal, affect normal/bright        ASSESSMENT/PLAN:   1. Annual physical exam  Advised healthy lifestyle.    2. Viral upper respiratory tract infection  Check COVID-19, advised quarantine until results come back.  - Symptomatic COVID-19 Virus (Coronavirus) by PCR    3. Immunization due  Patient is agreeable with Tdap and COVID-19 vaccine when he improves from URI.                COUNSELING:   Reviewed preventive health counseling, as reflected in patient instructions       Regular exercise       Healthy diet/nutrition      BMI:   Estimated body mass index is 26.91 kg/m  as calculated from the following:    Height as of this encounter: 1.943 m (6' 4.5\").    Weight as of this encounter: 101.6 kg (224 lb).   Weight management plan: high muscle mass, not overweight.      He reports that he has never smoked. He has never used smokeless tobacco.            Law Hill MD  [facility]  "

## 2024-05-31 ENCOUNTER — TRANSFERRED RECORDS (OUTPATIENT)
Dept: HEALTH INFORMATION MANAGEMENT | Facility: CLINIC | Age: 24
End: 2024-05-31
Payer: COMMERCIAL

## 2024-10-09 DIAGNOSIS — L70.0 ACNE VULGARIS: ICD-10-CM

## 2024-10-09 RX ORDER — TRETINOIN 0.5 MG/G
CREAM TOPICAL
Qty: 45 G | Refills: 0 | Status: SHIPPED | OUTPATIENT
Start: 2024-10-09

## 2024-10-18 ENCOUNTER — APPOINTMENT (OUTPATIENT)
Dept: URBAN - METROPOLITAN AREA CLINIC 256 | Age: 24
Setting detail: DERMATOLOGY
End: 2024-10-18

## 2024-10-18 DIAGNOSIS — Z41.9 ENCOUNTER FOR PROCEDURE FOR PURPOSES OTHER THAN REMEDYING HEALTH STATE, UNSPECIFIED: ICD-10-CM

## 2024-10-18 PROCEDURE — OTHER SKINPEN: OTHER

## 2024-10-18 ASSESSMENT — LOCATION DETAILED DESCRIPTION DERM
LOCATION DETAILED: RIGHT INFERIOR CENTRAL MALAR CHEEK
LOCATION DETAILED: LEFT CHIN
LOCATION DETAILED: LEFT INFERIOR CENTRAL MALAR CHEEK
LOCATION DETAILED: INFERIOR MID FOREHEAD

## 2024-10-18 ASSESSMENT — LOCATION SIMPLE DESCRIPTION DERM
LOCATION SIMPLE: RIGHT CHEEK
LOCATION SIMPLE: CHIN
LOCATION SIMPLE: LEFT CHEEK
LOCATION SIMPLE: INFERIOR FOREHEAD

## 2024-10-18 ASSESSMENT — LOCATION ZONE DERM: LOCATION ZONE: FACE

## 2024-11-04 ENCOUNTER — APPOINTMENT (OUTPATIENT)
Dept: URBAN - METROPOLITAN AREA CLINIC 256 | Age: 24
Setting detail: DERMATOLOGY
End: 2024-11-05

## 2024-11-04 VITALS — HEIGHT: 77 IN | WEIGHT: 225 LBS

## 2024-11-04 DIAGNOSIS — L64.8 OTHER ANDROGENIC ALOPECIA: ICD-10-CM

## 2024-11-04 DIAGNOSIS — L70.0 ACNE VULGARIS: ICD-10-CM

## 2024-11-04 PROCEDURE — OTHER MIPS QUALITY: OTHER

## 2024-11-04 PROCEDURE — OTHER COUNSELING: OTHER

## 2024-11-04 PROCEDURE — OTHER PATIENT SPECIFIC COUNSELING: OTHER

## 2024-11-04 PROCEDURE — 99214 OFFICE O/P EST MOD 30 MIN: CPT

## 2024-11-04 PROCEDURE — OTHER OTC TREATMENT REGIMEN: OTHER

## 2024-11-04 PROCEDURE — OTHER PHOTO-DOCUMENTATION: OTHER

## 2024-11-04 PROCEDURE — OTHER PRESCRIPTION MEDICATION MANAGEMENT: OTHER

## 2024-11-04 ASSESSMENT — LOCATION SIMPLE DESCRIPTION DERM
LOCATION SIMPLE: LEFT CHEEK
LOCATION SIMPLE: POSTERIOR SCALP
LOCATION SIMPLE: RIGHT CHEEK

## 2024-11-04 ASSESSMENT — LOCATION DETAILED DESCRIPTION DERM
LOCATION DETAILED: RIGHT CENTRAL MALAR CHEEK
LOCATION DETAILED: POSTERIOR MID-PARIETAL SCALP
LOCATION DETAILED: LEFT CENTRAL MALAR CHEEK

## 2024-11-04 ASSESSMENT — LOCATION ZONE DERM
LOCATION ZONE: SCALP
LOCATION ZONE: FACE

## 2024-11-04 NOTE — PROCEDURE: PRESCRIPTION MEDICATION MANAGEMENT
Plan: Patient will think about initiating Finasteride 1mg QD in the future. OK to send if he wants (6 mo supply with 6 mo recheck).
Render In Strict Bullet Format?: No
Detail Level: Zone

## 2024-11-04 NOTE — PROCEDURE: PATIENT SPECIFIC COUNSELING
- Could do biopsy if patient wants definitive diagnosis or test bloodwork.\\n- Patient opted to just treat immediately.\\n- Offered Finasteride pill vs topical Rogaine vs suggested hair transplant as a more definitive option.\\n- Patient opted for OTC topical Rogaine 5% for now.\\n- Discussed finasteride, and if patient changes his mind, okay to send. Would like to recheck 6 months after starting this if he decides to use this as well.
Detail Level: Zone
- Reassured patient that his micro-needling treatments has shown improvement.\\n- Suggested Fraxel and risks/benefits of it.\\n- Reviewed filler to the acne scar tissue as an option (1 syringe is enough to start).\\n- Discussed upcoming [employer] and special pricing.\\n- Patient expressed understanding.

## 2024-11-04 NOTE — PROCEDURE: COUNSELING
Detail Level: Zone
Isotretinoin Pregnancy And Lactation Text: This medication is Pregnancy Category X and is considered extremely dangerous during pregnancy. It is unknown if it is excreted in breast milk.
Dapsone Counseling: I discussed with the patient the risks of dapsone including but not limited to hemolytic anemia, agranulocytosis, rashes, methemoglobinemia, kidney failure, peripheral neuropathy, headaches, GI upset, and liver toxicity.  Patients who start dapsone require monitoring including baseline LFTs and weekly CBCs for the first month, then every month thereafter.  The patient verbalized understanding of the proper use and possible adverse effects of dapsone.  All of the patient's questions and concerns were addressed.
High Dose Vitamin A Pregnancy And Lactation Text: High dose vitamin A therapy is contraindicated during pregnancy and breast feeding.
Tetracycline Counseling: Patient counseled regarding possible photosensitivity and increased risk for sunburn.  Patient instructed to avoid sunlight, if possible.  When exposed to sunlight, patients should wear protective clothing, sunglasses, and sunscreen.  The patient was instructed to call the office immediately if the following severe adverse effects occur:  hearing changes, easy bruising/bleeding, severe headache, or vision changes.  The patient verbalized understanding of the proper use and possible adverse effects of tetracycline.  All of the patient's questions and concerns were addressed. Patient understands to avoid pregnancy while on therapy due to potential birth defects.
Benzoyl Peroxide Pregnancy And Lactation Text: This medication is Pregnancy Category C. It is unknown if benzoyl peroxide is excreted in breast milk.
Topical Sulfur Applications Counseling: Topical Sulfur Counseling: Patient counseled that this medication may cause skin irritation or allergic reactions.  In the event of skin irritation, the patient was advised to reduce the amount of the drug applied or use it less frequently.   The patient verbalized understanding of the proper use and possible adverse effects of topical sulfur application.  All of the patient's questions and concerns were addressed.
Topical Clindamycin Counseling: Patient counseled that this medication may cause skin irritation or allergic reactions.  In the event of skin irritation, the patient was advised to reduce the amount of the drug applied or use it less frequently.   The patient verbalized understanding of the proper use and possible adverse effects of clindamycin.  All of the patient's questions and concerns were addressed.
Spironolactone Counseling: Patient advised regarding risks of diarrhea, abdominal pain, hyperkalemia, birth defects (for female patients), liver toxicity and renal toxicity. The patient may need blood work to monitor liver and kidney function and potassium levels while on therapy. The patient verbalized understanding of the proper use and possible adverse effects of spironolactone.  All of the patient's questions and concerns were addressed.
Azelaic Acid Pregnancy And Lactation Text: This medication is considered safe during pregnancy and breast feeding.
Bactrim Counseling:  I discussed with the patient the risks of sulfa antibiotics including but not limited to GI upset, allergic reaction, drug rash, diarrhea, dizziness, photosensitivity, and yeast infections.  Rarely, more serious reactions can occur including but not limited to aplastic anemia, agranulocytosis, methemoglobinemia, blood dyscrasias, liver or kidney failure, lung infiltrates or desquamative/blistering drug rashes.
Tazorac Counseling:  Patient advised that medication is irritating and drying.  Patient may need to apply sparingly and wash off after an hour before eventually leaving it on overnight.  The patient verbalized understanding of the proper use and possible adverse effects of tazorac.  All of the patient's questions and concerns were addressed.
Winlevi Pregnancy And Lactation Text: This medication is considered safe during pregnancy and breastfeeding.
Use Enhanced Medication Counseling?: No
Sarecycline Counseling: Patient advised regarding possible photosensitivity and discoloration of the teeth, skin, lips, tongue and gums.  Patient instructed to avoid sunlight, if possible.  When exposed to sunlight, patients should wear protective clothing, sunglasses, and sunscreen.  The patient was instructed to call the office immediately if the following severe adverse effects occur:  hearing changes, easy bruising/bleeding, severe headache, or vision changes.  The patient verbalized understanding of the proper use and possible adverse effects of sarecycline.  All of the patient's questions and concerns were addressed.
Erythromycin Pregnancy And Lactation Text: This medication is Pregnancy Category B and is considered safe during pregnancy. It is also excreted in breast milk.
Aklief Pregnancy And Lactation Text: It is unknown if this medication is safe to use during pregnancy.  It is unknown if this medication is excreted in breast milk.  Breastfeeding women should use the topical cream on the smallest area of the skin for the shortest time needed while breastfeeding.  Do not apply to nipple and areola.
Birth Control Pills Counseling: Birth Control Pill Counseling: I discussed with the patient the potential side effects of OCPs including but not limited to increased risk of stroke, heart attack, thrombophlebitis, deep venous thrombosis, hepatic adenomas, breast changes, GI upset, headaches, and depression.  The patient verbalized understanding of the proper use and possible adverse effects of OCPs. All of the patient's questions and concerns were addressed.
Topical Retinoid counseling:  Patient advised to apply a pea-sized amount only at bedtime and wait 30 minutes after washing their face before applying.  If too drying, patient may add a non-comedogenic moisturizer. The patient verbalized understanding of the proper use and possible adverse effects of retinoids.  All of the patient's questions and concerns were addressed.
Topical Sulfur Applications Pregnancy And Lactation Text: This medication is Pregnancy Category C and has an unknown safety profile during pregnancy. It is unknown if this topical medication is excreted in breast milk.
Minocycline Counseling: Patient advised regarding possible photosensitivity and discoloration of the teeth, skin, lips, tongue and gums.  Patient instructed to avoid sunlight, if possible.  When exposed to sunlight, patients should wear protective clothing, sunglasses, and sunscreen.  The patient was instructed to call the office immediately if the following severe adverse effects occur:  hearing changes, easy bruising/bleeding, severe headache, or vision changes.  The patient verbalized understanding of the proper use and possible adverse effects of minocycline.  All of the patient's questions and concerns were addressed.
Tetracycline Pregnancy And Lactation Text: This medication is Pregnancy Category D and not consider safe during pregnancy. It is also excreted in breast milk.
Doxycycline Pregnancy And Lactation Text: This medication is Pregnancy Category D and not consider safe during pregnancy. It is also excreted in breast milk but is considered safe for shorter treatment courses.
Spironolactone Pregnancy And Lactation Text: This medication can cause feminization of the male fetus and should be avoided during pregnancy. The active metabolite is also found in breast milk.
Benzoyl Peroxide Counseling: Patient counseled that medicine may cause skin irritation and bleach clothing.  In the event of skin irritation, the patient was advised to reduce the amount of the drug applied or use it less frequently.   The patient verbalized understanding of the proper use and possible adverse effects of benzoyl peroxide.  All of the patient's questions and concerns were addressed.
Topical Clindamycin Pregnancy And Lactation Text: This medication is Pregnancy Category B and is considered safe during pregnancy. It is unknown if it is excreted in breast milk.
Azithromycin Counseling:  I discussed with the patient the risks of azithromycin including but not limited to GI upset, allergic reaction, drug rash, diarrhea, and yeast infections.
Dapsone Pregnancy And Lactation Text: This medication is Pregnancy Category C and is not considered safe during pregnancy or breast feeding.
High Dose Vitamin A Counseling: Side effects reviewed, pt to contact office should one occur.
Isotretinoin Counseling: Patient should get monthly blood tests, not donate blood, not drive at night if vision affected, not share medication, and not undergo elective surgery for 6 months after tx completed. Side effects reviewed, pt to contact office should one occur.
Birth Control Pills Pregnancy And Lactation Text: This medication should be avoided if pregnant and for the first 30 days post-partum.
Erythromycin Counseling:  I discussed with the patient the risks of erythromycin including but not limited to GI upset, allergic reaction, drug rash, diarrhea, increase in liver enzymes, and yeast infections.
Bactrim Pregnancy And Lactation Text: This medication is Pregnancy Category D and is known to cause fetal risk.  It is also excreted in breast milk.
Aklief counseling:  Patient advised to apply a pea-sized amount only at bedtime and wait 30 minutes after washing their face before applying.  If too drying, patient may add a non-comedogenic moisturizer.  The most commonly reported side effects including irritation, redness, scaling, dryness, stinging, burning, itching, and increased risk of sunburn.  The patient verbalized understanding of the proper use and possible adverse effects of retinoids.  All of the patient's questions and concerns were addressed.
Topical Retinoid Pregnancy And Lactation Text: This medication is Pregnancy Category C. It is unknown if this medication is excreted in breast milk.
Tazorac Pregnancy And Lactation Text: This medication is not safe during pregnancy. It is unknown if this medication is excreted in breast milk.
Azelaic Acid Counseling: Patient counseled that medicine may cause skin irritation and to avoid applying near the eyes.  In the event of skin irritation, the patient was advised to reduce the amount of the drug applied or use it less frequently.   The patient verbalized understanding of the proper use and possible adverse effects of azelaic acid.  All of the patient's questions and concerns were addressed.
Detail Level: Simple
Doxycycline Counseling:  Patient counseled regarding possible photosensitivity and increased risk for sunburn.  Patient instructed to avoid sunlight, if possible.  When exposed to sunlight, patients should wear protective clothing, sunglasses, and sunscreen.  The patient was instructed to call the office immediately if the following severe adverse effects occur:  hearing changes, easy bruising/bleeding, severe headache, or vision changes.  The patient verbalized understanding of the proper use and possible adverse effects of doxycycline.  All of the patient's questions and concerns were addressed.
Winlevi Counseling:  I discussed with the patient the risks of topical clascoterone including but not limited to erythema, scaling, itching, and stinging. Patient voiced their understanding.
Azithromycin Pregnancy And Lactation Text: This medication is considered safe during pregnancy and is also secreted in breast milk.

## 2024-11-04 NOTE — PROCEDURE: OTC TREATMENT REGIMEN
Detail Level: Zone
Patient Specific Otc Recommendations (Will Not Stick From Patient To Patient): Topical Rogaine 5% onto affected areas of scalp QD after showering.

## 2024-11-26 ENCOUNTER — ANCILLARY PROCEDURE (OUTPATIENT)
Dept: GENERAL RADIOLOGY | Facility: CLINIC | Age: 24
End: 2024-11-26
Attending: PHYSICIAN ASSISTANT
Payer: COMMERCIAL

## 2024-11-26 ENCOUNTER — OFFICE VISIT (OUTPATIENT)
Dept: URGENT CARE | Facility: URGENT CARE | Age: 24
End: 2024-11-26
Payer: COMMERCIAL

## 2024-11-26 VITALS
OXYGEN SATURATION: 95 % | TEMPERATURE: 98 F | SYSTOLIC BLOOD PRESSURE: 162 MMHG | DIASTOLIC BLOOD PRESSURE: 92 MMHG | RESPIRATION RATE: 18 BRPM | HEART RATE: 85 BPM

## 2024-11-26 DIAGNOSIS — R05.1 ACUTE COUGH: Primary | ICD-10-CM

## 2024-11-26 DIAGNOSIS — Z11.52 ENCOUNTER FOR SCREENING FOR COVID-19: ICD-10-CM

## 2024-11-26 LAB
ANION GAP SERPL CALCULATED.3IONS-SCNC: 13 MMOL/L (ref 7–15)
BASOPHILS # BLD AUTO: 0 10E3/UL (ref 0–0.2)
BASOPHILS NFR BLD AUTO: 0 %
BUN SERPL-MCNC: 8.8 MG/DL (ref 6–20)
CALCIUM SERPL-MCNC: 9.2 MG/DL (ref 8.8–10.4)
CHLORIDE SERPL-SCNC: 96 MMOL/L (ref 98–107)
CREAT SERPL-MCNC: 0.89 MG/DL (ref 0.67–1.17)
EGFRCR SERPLBLD CKD-EPI 2021: >90 ML/MIN/1.73M2
EOSINOPHIL # BLD AUTO: 0.3 10E3/UL (ref 0–0.7)
EOSINOPHIL NFR BLD AUTO: 3 %
ERYTHROCYTE [DISTWIDTH] IN BLOOD BY AUTOMATED COUNT: 12.7 % (ref 10–15)
GLUCOSE SERPL-MCNC: 94 MG/DL (ref 70–99)
HCO3 SERPL-SCNC: 27 MMOL/L (ref 22–29)
HCT VFR BLD AUTO: 42.4 % (ref 40–53)
HGB BLD-MCNC: 14.4 G/DL (ref 13.3–17.7)
IMM GRANULOCYTES # BLD: 0.1 10E3/UL
IMM GRANULOCYTES NFR BLD: 1 %
LYMPHOCYTES # BLD AUTO: 1.8 10E3/UL (ref 0.8–5.3)
LYMPHOCYTES NFR BLD AUTO: 17 %
MCH RBC QN AUTO: 29 PG (ref 26.5–33)
MCHC RBC AUTO-ENTMCNC: 34 G/DL (ref 31.5–36.5)
MCV RBC AUTO: 85 FL (ref 78–100)
MONOCYTES # BLD AUTO: 1.2 10E3/UL (ref 0–1.3)
MONOCYTES NFR BLD AUTO: 11 %
NEUTROPHILS # BLD AUTO: 7.6 10E3/UL (ref 1.6–8.3)
NEUTROPHILS NFR BLD AUTO: 69 %
PLATELET # BLD AUTO: 340 10E3/UL (ref 150–450)
POTASSIUM SERPL-SCNC: 4 MMOL/L (ref 3.4–5.3)
RBC # BLD AUTO: 4.97 10E6/UL (ref 4.4–5.9)
SARS-COV-2 RNA RESP QL NAA+PROBE: NEGATIVE
SODIUM SERPL-SCNC: 136 MMOL/L (ref 135–145)
WBC # BLD AUTO: 11 10E3/UL (ref 4–11)

## 2024-11-26 PROCEDURE — 85025 COMPLETE CBC W/AUTO DIFF WBC: CPT | Performed by: PHYSICIAN ASSISTANT

## 2024-11-26 PROCEDURE — 99214 OFFICE O/P EST MOD 30 MIN: CPT | Performed by: PHYSICIAN ASSISTANT

## 2024-11-26 PROCEDURE — 71046 X-RAY EXAM CHEST 2 VIEWS: CPT | Mod: TC | Performed by: RADIOLOGY

## 2024-11-26 PROCEDURE — 87635 SARS-COV-2 COVID-19 AMP PRB: CPT | Performed by: PHYSICIAN ASSISTANT

## 2024-11-26 PROCEDURE — 36415 COLL VENOUS BLD VENIPUNCTURE: CPT | Performed by: PHYSICIAN ASSISTANT

## 2024-11-26 PROCEDURE — 80048 BASIC METABOLIC PNL TOTAL CA: CPT | Performed by: PHYSICIAN ASSISTANT

## 2024-11-26 RX ORDER — BENZONATATE 100 MG/1
100 CAPSULE ORAL 3 TIMES DAILY PRN
Qty: 30 CAPSULE | Refills: 0 | Status: SHIPPED | OUTPATIENT
Start: 2024-11-26 | End: 2024-12-06

## 2024-11-26 RX ORDER — ALBUTEROL SULFATE 90 UG/1
2 INHALANT RESPIRATORY (INHALATION) EVERY 6 HOURS
Qty: 18 G | Refills: 0 | Status: SHIPPED | OUTPATIENT
Start: 2024-11-26 | End: 2024-12-26

## 2024-11-26 RX ORDER — AZITHROMYCIN 500 MG/1
500 TABLET, FILM COATED ORAL DAILY
Qty: 5 TABLET | Refills: 0 | Status: SHIPPED | OUTPATIENT
Start: 2024-11-26 | End: 2024-12-01

## 2024-11-26 NOTE — PROGRESS NOTES
Patient presents with:  Cough: Cough since last Thursday had fever headache now just cough and fatigue        Clinical Decision Making:  CBC did not show elevated white count.  Chest x-ray did not show infiltrate or consolidation.  However, patient had congestion heard on expiratory wheezes on the left lower lung field.  Patient was treated for bronchitis with albuterol inhaler, benzonatate and respiratory antibiotic. Expected course of resolution and indication for return was gone over and questions were answered to patient/parent's satisfaction before discharge.        ICD-10-CM    1. Acute cough  R05.1 XR Chest 2 Views     Basic metabolic panel     CBC with Platelets & Differential     albuterol (PROAIR HFA/PROVENTIL HFA/VENTOLIN HFA) 108 (90 Base) MCG/ACT inhaler     azithromycin (ZITHROMAX) 500 MG tablet     benzonatate (TESSALON) 100 MG capsule      2. Encounter for screening for COVID-19  Z11.52 COVID-19 Virus (Coronavirus) by PCR Nose          Patient Instructions   You were seen today for acute bronchitis.     Symptom management:  - Get plenty of rest  - Avoid smoking and second hand smoke  - May take tylenol or ibuprofen for fever/discomfort  - Drink plenty of non-caffeinated fluids  - Use nasal steroid spray for sinus congestion  - Albuterol inhaler may be used every 6 hours as needed for chest tightness      Reasons to be seen in the emergency room:  - Develop a fever of 100.4 or higher  - Cough changes, coughing up blood, or become short of breath  - Neck stiffness  - Chest pain  - Severe headache  - Unable to tolerate eating or drinking fluids    Otherwise, if no symptom improvement after 5 days, follow-up with your primary care provider.      HPI:  Alvin Echavarria is a 24 year old male who presents today with mother for evaluation of cough congestion fatigue headache myalgias subjective fever.  Patient denies arthralgias sore throat chills loss of appetite.  No known sick contacts at work or at home.   No COVID testing was performed at home.  Treatment at home is consisted of increased fluids rest and over-the-counter cough drops without relief.    History obtained from chart review and the patient.    Problem List:  2018-07: Plantar warts  2015-07: Allergic conjunctivitis of both eyes  2015-07: Acne      No past medical history on file.    Social History     Tobacco Use    Smoking status: Never    Smokeless tobacco: Never   Substance Use Topics    Alcohol use: Yes     Alcohol/week: 5.0 standard drinks of alcohol     Types: 5 Cans of beer per week       Review of Systems  As above in HPI otherwise negative.    Vitals:    11/26/24 1634   BP: (!) 162/92   Pulse: 85   Resp: 18   Temp: 98  F (36.7  C)   TempSrc: Oral   SpO2: 95%       General: Patient is resting comfortably no acute distress is afebrile  HEENT: Head is normocephalic atraumatic   eyes are PERRL EOMI sclera anicteric   TMs are clear bilaterally  Throat is with mild pharyngeal wall erythema and no exudate  No cervical lymphadenopathy present  LUNGS: In the left lower lung field there are prolonged expiratory wheezes and rhonchi.  HEART: Regular rate and rhythm  Skin: Without rash non-diaphoretic    Physical Exam      Labs:  Results for orders placed or performed in visit on 11/26/24   XR Chest 2 Views     Status: None    Narrative    EXAM: XR CHEST 2 VIEWS  LOCATION: [facility]  DATE: 11/26/2024    INDICATION: Cough.  COMPARISON: Chest radiograph 11/12/2021.      Impression    IMPRESSION:     Lungs are clear. No evidence of pneumonia. No pleural effusions or pneumothorax. Normal pulmonary vascularity. Nonenlarged cardiac silhouette.   Basic metabolic panel     Status: Abnormal   Result Value Ref Range    Sodium 136 135 - 145 mmol/L    Potassium 4.0 3.4 - 5.3 mmol/L    Chloride 96 (L) 98 - 107 mmol/L    Carbon Dioxide (CO2) 27 22 - 29 mmol/L    Anion Gap 13 7 - 15 mmol/L    Urea Nitrogen 8.8 6.0 - 20.0 mg/dL    Creatinine 0.89  0.67 - 1.17 mg/dL    GFR Estimate >90 >60 mL/min/1.73m2    Calcium 9.2 8.8 - 10.4 mg/dL    Glucose 94 70 - 99 mg/dL   CBC with platelets and differential     Status: None   Result Value Ref Range    WBC Count 11.0 4.0 - 11.0 10e3/uL    RBC Count 4.97 4.40 - 5.90 10e6/uL    Hemoglobin 14.4 13.3 - 17.7 g/dL    Hematocrit 42.4 40.0 - 53.0 %    MCV 85 78 - 100 fL    MCH 29.0 26.5 - 33.0 pg    MCHC 34.0 31.5 - 36.5 g/dL    RDW 12.7 10.0 - 15.0 %    Platelet Count 340 150 - 450 10e3/uL    % Neutrophils 69 %    % Lymphocytes 17 %    % Monocytes 11 %    % Eosinophils 3 %    % Basophils 0 %    % Immature Granulocytes 1 %    Absolute Neutrophils 7.6 1.6 - 8.3 10e3/uL    Absolute Lymphocytes 1.8 0.8 - 5.3 10e3/uL    Absolute Monocytes 1.2 0.0 - 1.3 10e3/uL    Absolute Eosinophils 0.3 0.0 - 0.7 10e3/uL    Absolute Basophils 0.0 0.0 - 0.2 10e3/uL    Absolute Immature Granulocytes 0.1 <=0.4 10e3/uL   CBC with Platelets & Differential     Status: None    Narrative    The following orders were created for panel order CBC with Platelets & Differential.  Procedure                               Abnormality         Status                     ---------                               -----------         ------                     CBC with platelets and d...[[id number]]                      Final result                 Please view results for these tests on the individual orders.       Radiology:  I have personally ordered and preliminarily reviewed the following xray, I have noted no infiltrate or consolidation    At the end of the encounter, I discussed results, diagnosis, medications. Discussed red flags for immediate return to clinic/ER, as well as indications for follow up if no improvement. Patient understood and agreed to plan. Patient was stable for discharge.

## 2024-11-26 NOTE — PATIENT INSTRUCTIONS
You were seen today for acute bronchitis.     Symptom management:  - Get plenty of rest  - Avoid smoking and second hand smoke  - May take tylenol or ibuprofen for fever/discomfort  - Drink plenty of non-caffeinated fluids  - Use nasal steroid spray for sinus congestion  - Albuterol inhaler may be used every 6 hours as needed for chest tightness      Reasons to be seen in the emergency room:  - Develop a fever of 100.4 or higher  - Cough changes, coughing up blood, or become short of breath  - Neck stiffness  - Chest pain  - Severe headache  - Unable to tolerate eating or drinking fluids    Otherwise, if no symptom improvement after 5 days, follow-up with your primary care provider.

## 2025-01-04 ENCOUNTER — TRANSFERRED RECORDS (OUTPATIENT)
Dept: HEALTH INFORMATION MANAGEMENT | Facility: CLINIC | Age: 25
End: 2025-01-04
Payer: COMMERCIAL